# Patient Record
Sex: MALE | Race: BLACK OR AFRICAN AMERICAN | NOT HISPANIC OR LATINO | URBAN - METROPOLITAN AREA
[De-identification: names, ages, dates, MRNs, and addresses within clinical notes are randomized per-mention and may not be internally consistent; named-entity substitution may affect disease eponyms.]

---

## 2019-08-13 ENCOUNTER — HOSPITAL ENCOUNTER (OUTPATIENT)
Dept: RADIOLOGY | Facility: HOSPITAL | Age: 54
Discharge: HOME | End: 2019-08-13
Attending: FAMILY MEDICINE
Payer: COMMERCIAL

## 2019-08-13 DIAGNOSIS — I82.402 DEEP VEIN THROMBOSIS OF LEFT LOWER LIMB: Primary | ICD-10-CM

## 2019-08-13 DIAGNOSIS — I82.402 DEEP VEIN THROMBOSIS OF LEFT LOWER LIMB: ICD-10-CM

## 2019-08-13 PROCEDURE — 93971 EXTREMITY STUDY: CPT | Mod: TC,PO,LT

## 2019-11-22 DIAGNOSIS — R06.83 SNORING: Primary | ICD-10-CM

## 2019-11-22 DIAGNOSIS — G47.19 EXCESSIVE DAYTIME SLEEPINESS: ICD-10-CM

## 2019-11-22 DIAGNOSIS — R53.83 FATIGUE: ICD-10-CM

## 2019-11-25 ENCOUNTER — TELEPHONE (OUTPATIENT)
Dept: SLEEP MEDICINE | Facility: OTHER | Age: 54
End: 2019-11-25

## 2019-12-12 ENCOUNTER — TELEPHONE (OUTPATIENT)
Dept: SLEEP MEDICINE | Facility: OTHER | Age: 54
End: 2019-12-12

## 2019-12-13 ENCOUNTER — HOSPITAL ENCOUNTER (OUTPATIENT)
Dept: SLEEP MEDICINE | Facility: OTHER | Age: 54
Discharge: HOME | End: 2019-12-13
Attending: OTOLARYNGOLOGY
Payer: COMMERCIAL

## 2019-12-13 DIAGNOSIS — G47.33 OSA (OBSTRUCTIVE SLEEP APNEA): ICD-10-CM

## 2019-12-13 DIAGNOSIS — R06.83 SNORING: ICD-10-CM

## 2019-12-13 PROCEDURE — 95800 SLP STDY UNATTENDED: CPT

## 2019-12-20 PROCEDURE — 95800 PR SLEEP STUDY, UNATTENDED, RECORD HEART RATE/O2 SAT/RESP ANAL/SLEEP TIME: ICD-10-PCS | Mod: 26,,, | Performed by: PSYCHIATRY & NEUROLOGY

## 2019-12-20 PROCEDURE — 95800 SLP STDY UNATTENDED: CPT | Mod: 26,,, | Performed by: PSYCHIATRY & NEUROLOGY

## 2019-12-20 NOTE — PROCEDURES
"Dear Referring Provider,    The sleep study that you ordered is complete. You have ordered sleep LAB services to perform the sleep study for Mehran Griffin.     Please find Sleep Study result in "Chart Review" under the "Media tab."     As the ordering provider, you are responsible for reviewing the results and implementing a treatment plan with your patient. If you need a Sleep Medicine provider to explain the sleep study findings and arrange treatment for the patient, please refer patient for consultation to our Sleep Clinic via [facility] with Ambulatory Consult Sleep.    To do that please place an order for an "Ambulatory Consult Sleep" - it will go to our clinic work queue for our Medical Assistant to contact the patient for an appointment.     For any questions, please contact our clinic staff at [phone] to talk to clinical staff.      "

## 2020-01-14 ENCOUNTER — TELEPHONE (OUTPATIENT)
Dept: SLEEP MEDICINE | Facility: OTHER | Age: 55
End: 2020-01-14

## 2020-01-14 DIAGNOSIS — R06.89 GASPING FOR BREATH: ICD-10-CM

## 2020-01-14 DIAGNOSIS — R53.83 FATIGUE: ICD-10-CM

## 2020-01-14 DIAGNOSIS — R06.81 WITNESSED EPISODE OF APNEA: ICD-10-CM

## 2020-01-14 DIAGNOSIS — R06.83 SNORING: Primary | ICD-10-CM

## 2020-01-14 DIAGNOSIS — G47.19 EXCESSIVE DAYTIME SLEEPINESS: ICD-10-CM

## 2020-01-23 ENCOUNTER — TELEPHONE (OUTPATIENT)
Dept: SLEEP MEDICINE | Facility: OTHER | Age: 55
End: 2020-01-23

## 2020-01-29 ENCOUNTER — TELEPHONE (OUTPATIENT)
Dept: SLEEP MEDICINE | Facility: OTHER | Age: 55
End: 2020-01-29

## 2020-02-03 ENCOUNTER — TELEPHONE (OUTPATIENT)
Dept: SLEEP MEDICINE | Facility: OTHER | Age: 55
End: 2020-02-03

## 2020-05-13 ENCOUNTER — TELEPHONE (OUTPATIENT)
Dept: UROLOGY | Facility: CLINIC | Age: 55
End: 2020-05-13

## 2020-05-13 NOTE — TELEPHONE ENCOUNTER
----- Message from Jose Philip sent at 5/13/2020 12:20 PM CDT -----  Contact: Patient @ [phone]  Patient requesting a return call to discuss the Vasecotomy process relating to consult and surgery date due to traveling, pls call

## 2020-07-22 ENCOUNTER — OFFICE VISIT (OUTPATIENT)
Dept: UROLOGY | Facility: CLINIC | Age: 55
End: 2020-07-22
Payer: COMMERCIAL

## 2020-07-22 DIAGNOSIS — Z30.2 ENCOUNTER FOR MALE STERILIZATION PROCEDURE: Primary | ICD-10-CM

## 2020-07-22 PROCEDURE — 99204 OFFICE O/P NEW MOD 45 MIN: CPT | Mod: S$GLB,,, | Performed by: UROLOGY

## 2020-07-22 PROCEDURE — 99204 PR OFFICE/OUTPT VISIT, NEW, LEVL IV, 45-59 MIN: ICD-10-PCS | Mod: S$GLB,,, | Performed by: UROLOGY

## 2020-07-22 PROCEDURE — 99999 PR PBB SHADOW E&M-EST. PATIENT-LVL III: ICD-10-PCS | Mod: PBBFAC,,, | Performed by: UROLOGY

## 2020-07-22 PROCEDURE — 99999 PR PBB SHADOW E&M-EST. PATIENT-LVL III: CPT | Mod: PBBFAC,,, | Performed by: UROLOGY

## 2020-07-22 RX ORDER — LISINOPRIL 40 MG/1
TABLET ORAL
COMMUNITY
Start: 2020-07-01

## 2020-07-22 RX ORDER — ATORVASTATIN CALCIUM 40 MG/1
TABLET, FILM COATED ORAL
COMMUNITY
Start: 2020-07-01

## 2020-07-22 NOTE — PROGRESS NOTES
Chief Complaint:   Undesired fertility    HPI:  Mr. Griffin is a 55 y.o.  male who presents for evaluation re vasectomy. He has 1 children, ages 17 yo, and he is certain that he desires no more. He is aware of other forms of contraception.  He's currently using nothing for contraception. He is aware that vasectomy is to be considered permanent/irreversible.    He denies orchalgia.  No dysuria.  No hematuria.    ROS:  Mehran Griffin denies headache, blurred vision, fever, nausea, vomiting, chills, abdominal pain, chest pain, sore throat, bleeding per rectum, cough, SOB, recent loss of consciousness, recent mental status changes, seizures, dizziness, or upper or lower extremity weakness.    Past Medical History    Past Medical History:   Diagnosis Date    High cholesterol     HTN (hypertension)        Past Surgical History    Past Surgical History:   Procedure Laterality Date    ARTHROSCOPY OF BOTH KNEES         Social History    Social History     Socioeconomic History    Marital status:      Spouse name: Not on file    Number of children: Not on file    Years of education: Not on file    Highest education level: Not on file   Occupational History    Not on file   Social Needs    Financial resource strain: Not on file    Food insecurity     Worry: Not on file     Inability: Not on file    Transportation needs     Medical: Not on file     Non-medical: Not on file   Tobacco Use    Smoking status: Never Smoker    Smokeless tobacco: Never Used   Substance and Sexual Activity    Alcohol use: Yes    Drug use: Never    Sexual activity: Yes     Partners: Female   Lifestyle    Physical activity     Days per week: Not on file     Minutes per session: Not on file    Stress: Not on file   Relationships    Social connections     Talks on phone: Not on file     Gets together: Not on file     Attends Protestant service: Not on file     Active member of club or organization: Not on file     Attends meetings  of clubs or organizations: Not on file     Relationship status: Not on file   Other Topics Concern    Not on file   Social History Narrative    Not on file       Family History  History reviewed. No pertinent family history.      Medications    Current Outpatient Medications:     atorvastatin (LIPITOR) 40 MG tablet, , Disp: , Rfl:     lisinopriL (PRINIVIL,ZESTRIL) 40 MG tablet, , Disp: , Rfl:     Allergies  Review of patient's allergies indicates:  No Known Allergies      ?  PHYSICAL EXAM:    The patient generally appears in good health, is appropriately interactive, and is in no apparent distress.     Eyes: anicteric sclerae, moist conjunctivae; no lid-lag; PERRLA     HENT: Atraumatic; oropharynx clear with moist mucous membranes and no mucosal ulcerations;normal hard and soft palate.  No evidence of lymphadenopathy.    Neck: Trachea midline.  No thyromegaly.    Musculoskeletal: No abnormal gait.    Skin: No lesions.    Mental: Cooperative with normal affect.  Is oriented to time, place, and person.    Neuro: Grossly intact.    Chest: Normal inspiratory effort.   No accessory muscles.  No audible wheezes.  Respirations symmetric on inspiration and expiration.    Heart: Regular rhythm.      Abdomen:  Soft, non-tender. No masses or organomegaly. Bladder is not palpable. No evidence of flank discomfort. No evidence of inguinal hernia.    Genitourinary: The penis has no evidence of plaques or induration. The urethral meatus is normal. The testes, epididymides, and cord structures are normal in size and contour bilaterally. The scrotum is normal in size and contour.    Extremities: No clubbing, cyanosis, or edema          A/P:  Mehran Griffin is a 55 y.o. male who presents for evaluation re vasectomy.    1.I discussed with the patient risks and benefits, as well as alternatives. We discussed possible complications at length, including fever, infection, bleeding--possibly requiring reoperation,testicular injury or  atrophy with loss of function, chronic pain, persistent or recurrent presence of sperm in the ejaculate, vasal recanalization, as well as the risks attendant to the anesthetic.  2.We discussed that he should stop aspirin, ibuprofen, and similar products at least one week prior to the procedure. Acetominophen is okay to use for headaches, pain, etc.  3. He should bring an athletic supporter and loose fitting sweat pants on the day of the procedure.  4. We discussed that he must continue to use contraception until two consecutive semen analyses (checked at approximately 4-6 weeks post-vasectomy) reveal azoospermia.  5. He will schedule an elective vasectomy.

## 2020-07-22 NOTE — PATIENT INSTRUCTIONS
Having a Vasectomy: Before, During, and After the Procedure  Vasectomy is an outpatient (same day) procedure. It can be done in a doctors office, clinic, or hospital. Your doctor will talk with you about preparing for surgery. He or she will also discuss the possible risks and complications with you. After the procedure, follow all your doctors advice for recovery.  Preparing for Surgery      The cut ends of the vas may be tied, closed with a clip, or sealed by heat (cauterized).    Your doctor will talk with you about getting ready for surgery. You may be asked to do the following:  · Sign a consent form. This must be done at least a few days before surgery. It gives your doctor permission to do the procedure. It also states that a vasectomy is not guaranteed to make you sterile.  · Dont take aspirin, ibuprofen, or naproxen for 1 week before surgery or 1 week after. These medications can cause bleeding after the procedure. Also, tell your doctor if you take any medications, supplements, or herbal remedies.  · Tell your doctor if youve had any prior scrotal surgery.  · Arrange for an adult family member or friend to give you a ride home after surgery.  · Shower and clean your scrotum the day of surgery. Your doctor may also ask you to shave your scrotum.  · Bring an athletic supporter (jockstrap) and a pair of loose fitting pants to the doctors office or hospital.  · Eat no more than a light snack before surgery.  During Surgery  The entire procedure usually lasts less than 30 minutes.  · Youll be asked to undress and lie on a table.  · You may be given medication to help you relax. To prevent pain during surgery, youll be given an injection of local anesthetic in your scrotum or lower groin.  · Once the area is numb, one or two small incisions are made in the scrotum.   · The vas deferens are lifted through the incision and cut. The ends of the vas are then sealed off using one of several methods.  · If  needed, the incision is closed with stitches.  · You can rest for a while until youre ready to go home.  Recovering at Home  For about a week, your scrotum may look bruised and slightly swollen. You may also have a small amount of bloody discharge from the incision. This is normal.  To help make your recovery more comfortable, follow the tips below.  · Stay off your feet as much as possible for the first 2 days.   · Wear an athletic supporter or snug cotton briefs for support.  · Ice the area for 24 hours  · Take medications with acetaminophen (such as Tylenol) to relieve any discomfort. Dont use aspirin, ibuprofen, or naproxen.  · Avoid heavy lifting, exercise, or intercourse for 7 days.  · Remember: You must use another form of birth control until youre completely sterile.  Call your doctor if you notice any of the following after surgery:   · Increasing pain or swelling in your scrotum  · A large black-and-blue area, or a growing lump  · Fever or chills  · Increasing redness or drainage of the incision  · Trouble urinating    Sex After Vasectomy  Vasectomy doesnt change your sexual function. So when you start having sex again, it should feel the same as before. A vasectomy also shouldnt affect your relationship with your partner. Its important to remember, though, that you wont become sterile right away. It will take time before you can have sex without the need for birth control.  · Until youre sterile: After a vasectomy, some active sperm still remain in your semen. It will take time and many ejaculations before the sperm are completely gone. During this period, you must use another birth control method to prevent pregnancy. To make sure no sperm are left in your semen, youll need to have one or more semen exams. You usually collect a semen sample at home and bring it to a lab. The sample is then checked under a microscope. Youre sterile only when these samples show no evidence of sperm. Ask your  doctor whether additional follow-up is needed.  · After youre sterile: After your doctor tells you youre sterile, you no longer need to use any form of birth control. Youre free to have sex without the fear of unwanted pregnancy. However, a vasectomy does not protect you from sexually transmitted diseases (STDs). If you have more than one sex partner, be sure to practice safer sex by using condoms.  © 3410-8737 [facility], [address] [place] [address], [place] [address]. All rights reserved. This information is not intended as a substitute for professional medical care. Always follow your healthcare professional's instructions.    Vasectomy: Risks and Complications  A vasectomy is an outpatient procedure. This means youll go home the same day. Its done in a doctors office, clinic, or hospital. Before your procedure, youll be asked to read and sign a consent form. This form states youre aware of the possible risks and complications. It also says that you understand that the procedure, though most often successful, cant promise to make you sterile. Be sure you have all your questions answered before you sign this form. Below is a list of risks and possible complications of the procedure.  Risks and Possible Complications of Vasectomy   Vasectomy is safe. But it does have risks. They include the following:  · Bleeding or infection.  · Sperm granuloma. This is a small, harmless lump. It may form where the vas deferens is sealed off.  · Loss of Testicle  · Epididymitis.This is inflammation that may cause scrotal aching. It often goes away without treatment. Anti-inflammatory medications can provide relief.  · Reconnection of the vas deferens. This can occur in rare cases. It makes you fertile again. This can result in an unwanted pregnancy.  · Sperm antibodies.These are a common response of the body to absorbed sperm. The antibodies can make you sterile. This is true even if you later try to reverse your  vasectomy.  · Long-term testicular discomfort.This may occur after surgery. But its very rare.    © 4853-4436 [facility], [address] [place] [address], [place] [address]. All rights reserved. This information is not intended as a substitute for professional medical care. Always follow your healthcare professional's instructions.

## 2020-08-24 ENCOUNTER — TELEPHONE (OUTPATIENT)
Dept: UROLOGY | Facility: CLINIC | Age: 55
End: 2020-08-24

## 2020-08-24 NOTE — TELEPHONE ENCOUNTER
----- Message from Marifer Romano sent at 8/24/2020  8:33 AM CDT -----  Regarding: Cancel procedure  Contact: pt  Reason: Calling to cancel procedure scheduled on 09/04/2020.    Communication:[phone]

## 2022-04-20 ENCOUNTER — TELEPHONE (OUTPATIENT)
Dept: SLEEP MEDICINE | Facility: CLINIC | Age: 57
End: 2022-04-20